# Patient Record
Sex: FEMALE | Race: WHITE | Employment: STUDENT | URBAN - METROPOLITAN AREA
[De-identification: names, ages, dates, MRNs, and addresses within clinical notes are randomized per-mention and may not be internally consistent; named-entity substitution may affect disease eponyms.]

---

## 2024-06-17 ENCOUNTER — OFFICE VISIT (OUTPATIENT)
Age: 19
End: 2024-06-17
Payer: COMMERCIAL

## 2024-06-17 VITALS
HEART RATE: 75 BPM | BODY MASS INDEX: 19.88 KG/M2 | HEIGHT: 63 IN | DIASTOLIC BLOOD PRESSURE: 64 MMHG | SYSTOLIC BLOOD PRESSURE: 113 MMHG | WEIGHT: 112.2 LBS

## 2024-06-17 DIAGNOSIS — M79.89 MASS OF SOFT TISSUE OF NECK: ICD-10-CM

## 2024-06-17 DIAGNOSIS — L72.0 EPIDERMAL CYST OF NECK: Primary | ICD-10-CM

## 2024-06-17 DIAGNOSIS — D36.7 DERMOID CYST OF NECK: Primary | ICD-10-CM

## 2024-06-17 PROCEDURE — 99202 OFFICE O/P NEW SF 15 MIN: CPT | Performed by: SURGERY

## 2024-06-17 NOTE — PROGRESS NOTES
Troy Pacheco MD   General and Robotic surgery  [address] [facility] [address], [address]  [place] [address]  Phone [phone]   Fax [phone]      Date of visit: 2024      Primary/Requesting provider: Yanet Roa PA-C         Name: Kathryn Graham      MRN: [id number]       : 2005       Age: 18 y.o.    Sex: female        PCP: Yanet Roa PA-C     CC:    Chief Complaint   Patient presents with    New Patient     NEW PATIENT - NP - Cyst near throat area / [place] *ok per Dr. Jose Manuel donis [place]         HPI:     Kathryn Graham is a 18 y.o. female with a longstanding history of a small subcentimeter sized cystic formation in the midline above the larynx.  Was evaluated by ultrasound in 2016 the patient is concerned that it is growing in size.  There was no absolute characterization by ultrasound at that time, and at this point after examining, I think a CT scan of the neck would be appropriate.  It will show any extended anatomy as well as shed some light on the possibility of this being a thyroglossal duct cyst.  I have also spoken with the patient about the necessity of a scar associated with any resection of a benign nondangerous lesion.  We will see her back in clinic when her CT is performed.      History reviewed. No pertinent past medical history.     Past Surgical History:   Procedure Laterality Date    TYMPANOSTOMY TUBE PLACEMENT          No current outpatient medications on file.     No current facility-administered medications for this visit.        No Known Allergies     Social History       Tobacco History       Smoking Status  Never Assessed      Smokeless Tobacco Use  Unknown              Alcohol History       Alcohol Use Status  Not Asked              Drug Use       Drug Use Status  Not Asked              Sexual Activity       Sexually Active  Not Asked                     History reviewed. No pertinent family history.      Reviewed and updated this visit by

## 2024-07-02 ENCOUNTER — HOSPITAL ENCOUNTER (OUTPATIENT)
Dept: CT IMAGING | Age: 19
Discharge: HOME | End: 2024-07-04
Attending: SURGERY
Payer: COMMERCIAL

## 2024-07-02 DIAGNOSIS — M79.89 MASS OF SOFT TISSUE OF NECK: ICD-10-CM

## 2024-07-02 DIAGNOSIS — D36.7 DERMOID CYST OF NECK: ICD-10-CM

## 2024-07-02 PROCEDURE — 6360000004 HC RX CONTRAST MEDICATION: Performed by: SURGERY

## 2024-07-02 PROCEDURE — 70491 CT SOFT TISSUE NECK W/DYE: CPT

## 2024-07-02 RX ADMIN — IOPAMIDOL 80 ML: 755 INJECTION, SOLUTION INTRAVENOUS at 15:43

## 2024-07-22 ENCOUNTER — OFFICE VISIT (OUTPATIENT)
Age: 19
End: 2024-07-22
Payer: COMMERCIAL

## 2024-07-22 VITALS
HEART RATE: 80 BPM | BODY MASS INDEX: 19.56 KG/M2 | DIASTOLIC BLOOD PRESSURE: 59 MMHG | HEIGHT: 63 IN | WEIGHT: 110.4 LBS | SYSTOLIC BLOOD PRESSURE: 103 MMHG

## 2024-07-22 DIAGNOSIS — L72.0 EPIDERMAL CYST OF NECK: Primary | ICD-10-CM

## 2024-07-22 DIAGNOSIS — D36.7 DERMOID CYST OF NECK: ICD-10-CM

## 2024-07-22 DIAGNOSIS — D36.7 DERMOID CYST OF NECK: Primary | ICD-10-CM

## 2024-07-22 PROCEDURE — 99212 OFFICE O/P EST SF 10 MIN: CPT | Performed by: SURGERY

## 2024-07-22 NOTE — PROGRESS NOTES
Troy Pacheco MD   General and Robotic surgery  [address] [facility], [address]  [place] [address]  Phone [phone]   Fax [phone]      Date of visit: 2024      Primary/Requesting provider: Yanet Rao PA-C         Name: Kathryn Graham      MRN: [id number]       : 2005       Age: 18 y.o.    Sex: female        PCP: Yanet Roa PA-C     CC:    Chief Complaint   Patient presents with    Follow-up     FU - Cyst near throat area, discus CT results         HPI:     Kathryn Graham is a 18 y.o. female seen previously with a small cystic formation on the anterior neck.  No changes in the cyst since our last visit.  There has been some question as to whether or not this mass represents a simple sebaceous cyst or the possibility of a thyroglossal cyst.  The CT is not conclusive.  In all likelihood this is a very straightforward cyst to remove, however due to the possibility of a thyroglossal cyst being present I think the responsible referral is to ENT for this situation.  I had a discussion with the patient and her mother regarding my reasoning for the referral and they are in agreement.      History reviewed. No pertinent past medical history.     Past Surgical History:   Procedure Laterality Date    TYMPANOSTOMY TUBE PLACEMENT          Current Outpatient Medications   Medication Sig Dispense Refill    Loratadine (CLARITIN PO) Take by mouth       No current facility-administered medications for this visit.        No Known Allergies     Social History       Tobacco History       Smoking Status  Never Assessed      Smokeless Tobacco Use  Unknown              Alcohol History       Alcohol Use Status  Not Asked              Drug Use       Drug Use Status  Not Asked              Sexual Activity       Sexually Active  Not Asked                     History reviewed. No pertinent family history.      Reviewed and updated this visit by provider:  Allergies  Meds  Problems  Med Hx